# Patient Record
(demographics unavailable — no encounter records)

---

## 2025-04-10 NOTE — DISCUSSION/SUMMARY
[FreeTextEntry1] : Mr. Payan is an 88 year-old male s/p Micra leadless pacer for symptomatic bradycardia.  He is doing well and has no complaints at this time.  He device function is normal.  He will follow-up in 6 months or sooner if needed.    See data from pacer check in Diag section.

## 2025-04-10 NOTE — HISTORY OF PRESENT ILLNESS
[de-identified] : Mr. Payan is an 88-year-old man with chronic AF (not on AC due to frequent falls complicated by ICH), severe AS s/p BAV 2020 c/b bradycardia, s/ p Medtronic Micra VR 2020. Due to recurrent admissions for decompensated HF and worsening of his AS, he underwent TAVR with Dari valve in July 2022.  He presents for pacemaker follow-up. He denies chest pain, SOB at rest, LE edema, orthopnea, PND, palpitations, and syncope.

## 2025-04-10 NOTE — HISTORY OF PRESENT ILLNESS
[de-identified] : Mr. Payan is an 88-year-old man with chronic AF (not on AC due to frequent falls complicated by ICH), severe AS s/p BAV 2020 c/b bradycardia, s/ p Medtronic Micra VR 2020. Due to recurrent admissions for decompensated HF and worsening of his AS, he underwent TAVR with Dari valve in July 2022.  He presents for pacemaker follow-up. He denies chest pain, SOB at rest, LE edema, orthopnea, PND, palpitations, and syncope.

## 2025-04-10 NOTE — ADDENDUM
[FreeTextEntry1] : I, Elisabeth Soni, am scribing for and the presence of Dr. King the following sections: HPI, PMH,Family/social history, ROS, Physical Exam, Assessment / Plan.  I, Bruno King, personally performed the services described in the documentation, reviewed the documentation recorded by the scribe in my presence and it accurately and completely records my words and actions.

## 2025-04-10 NOTE — PHYSICAL EXAM
[No Deformities] : no deformities [Respiration, Rhythm And Depth] : normal respiratory rhythm and effort [Auscultation Breath Sounds / Voice Sounds] : lungs were clear to auscultation bilaterally [Abdomen Soft] : soft [Abdomen Tenderness] : non-tender [] : no hepato-splenomegaly [Abdomen Mass (___ Cm)] : no abdominal mass palpated [Nail Clubbing] : no clubbing of the fingernails [Cyanosis, Localized] : no localized cyanosis [FreeTextEntry1] : irreg irreg, rate controlled

## 2025-04-10 NOTE — PROCEDURE
[Pacemaker] : pacemaker [VVI] : VVI [No] : not [Atrial Fibrillation] : atrial fibrillation [___V @] : [unfilled] V [___ ms] : [unfilled] ms [Pace ___ %] : Pace [unfilled]% [de-identified] : Medtronic  [de-identified] : Rusty RASHEED [de-identified] : [id number] [de-identified] : 6/30/2020 [de-identified] : 50 [de-identified] : 6.1  years

## 2025-04-10 NOTE — PROCEDURE
[Pacemaker] : pacemaker [VVI] : VVI [No] : not [Atrial Fibrillation] : atrial fibrillation [___V @] : [unfilled] V [___ ms] : [unfilled] ms [Pace ___ %] : Pace [unfilled]% [de-identified] : Medtronic  [de-identified] : Rusty RASHEED [de-identified] : [id number] [de-identified] : 6/30/2020 [de-identified] : 50 [de-identified] : 6.1  years